# Patient Record
Sex: MALE | Race: WHITE | NOT HISPANIC OR LATINO | URBAN - METROPOLITAN AREA
[De-identification: names, ages, dates, MRNs, and addresses within clinical notes are randomized per-mention and may not be internally consistent; named-entity substitution may affect disease eponyms.]

---

## 2024-06-28 ENCOUNTER — TELEPHONE (OUTPATIENT)
Dept: GASTROENTEROLOGY | Facility: CLINIC | Age: 29
End: 2024-06-28

## 2024-06-28 NOTE — TELEPHONE ENCOUNTER
Pt mother states that her son states that when he wiped he saw blood. She wants to know if he should be worried? States this only happened one time.

## 2024-08-09 ENCOUNTER — APPOINTMENT (OUTPATIENT)
Dept: PRIMARY CARE | Facility: CLINIC | Age: 29
End: 2024-08-09
Payer: COMMERCIAL

## 2024-08-09 VITALS
TEMPERATURE: 98.1 F | HEIGHT: 67 IN | SYSTOLIC BLOOD PRESSURE: 121 MMHG | WEIGHT: 138 LBS | OXYGEN SATURATION: 98 % | BODY MASS INDEX: 21.66 KG/M2 | DIASTOLIC BLOOD PRESSURE: 78 MMHG | HEART RATE: 47 BPM

## 2024-08-09 DIAGNOSIS — Z00.00 HEALTHCARE MAINTENANCE: Primary | ICD-10-CM

## 2024-08-09 PROCEDURE — 84443 ASSAY THYROID STIM HORMONE: CPT | Performed by: INTERNAL MEDICINE

## 2024-08-09 PROCEDURE — 1036F TOBACCO NON-USER: CPT | Performed by: INTERNAL MEDICINE

## 2024-08-09 PROCEDURE — 99395 PREV VISIT EST AGE 18-39: CPT | Performed by: INTERNAL MEDICINE

## 2024-08-09 PROCEDURE — 3008F BODY MASS INDEX DOCD: CPT | Performed by: INTERNAL MEDICINE

## 2024-08-09 PROCEDURE — 85025 COMPLETE CBC W/AUTO DIFF WBC: CPT | Performed by: INTERNAL MEDICINE

## 2024-08-09 PROCEDURE — 80061 LIPID PANEL: CPT | Performed by: INTERNAL MEDICINE

## 2024-08-09 PROCEDURE — 80053 COMPREHEN METABOLIC PANEL: CPT | Performed by: INTERNAL MEDICINE

## 2024-08-09 ASSESSMENT — PATIENT HEALTH QUESTIONNAIRE - PHQ9
SUM OF ALL RESPONSES TO PHQ9 QUESTIONS 1 AND 2: 0
1. LITTLE INTEREST OR PLEASURE IN DOING THINGS: NOT AT ALL
2. FEELING DOWN, DEPRESSED OR HOPELESS: NOT AT ALL

## 2024-08-09 ASSESSMENT — ENCOUNTER SYMPTOMS
LOSS OF SENSATION IN FEET: 0
OCCASIONAL FEELINGS OF UNSTEADINESS: 0
DEPRESSION: 0

## 2024-08-09 ASSESSMENT — PAIN SCALES - GENERAL: PAINLEVEL: 0-NO PAIN

## 2024-08-09 NOTE — PROGRESS NOTES
"Subjective   Patient ID: David Hanna is a 29 y.o. male who presents for Annual Exam (Pt c/o interrmitten stomach pains).    HPI   29 years old male comes in to see me for an age related wellness exam.  He has no previous medical or surgical history except for history of gastritis and heartburn most likely reflux disease.  He knows the cause of it and he knows it is old related to food he ate.  He loves spicy food.  No prescription medication  No past medical or surgical history.  He had appendectomy years ago.  No known allergies.  Non-smoker no vaping.  No alcohol occasionally rarely.  Single no children.  Works at [employer].  GI manage in the past with Dr. Justice.    Review of Systems  12 system review 12 system negative except for heartburn after certain food he eats.  Objective   /78 (BP Location: Left arm, Patient Position: Sitting, BP Cuff Size: Adult)   Pulse (!) 47   Temp 36.7 °C (98.1 °F) (Temporal)   Ht 1.695 m (5' 6.73\")   Wt 62.6 kg (138 lb)   SpO2 98%   BMI 21.79 kg/m²     Physical Exam  Alert oriented no distress.  Nonicteric sclera no jaundice.  Face symmetrical cranial nerves intact.  Neck no masses no lymphadenopathy no thyromegaly.  Lungs clear no rales wheezing or crackles.  Heart normal S1 and S2 regular rhythm.  Abdomen benign nontender no masses no organomegaly no pain on palpation.  Neurological exam intact.  Assessment/Plan   Diagnoses and all orders for this visit:  Healthcare maintenance  -     CBC  -     Lipid Panel  -     Thyroid Stimulating Hormone  -     Comprehensive Metabolic Panel         "

## 2024-08-13 ENCOUNTER — TELEPHONE (OUTPATIENT)
Dept: PRIMARY CARE | Facility: CLINIC | Age: 29
End: 2024-08-13
Payer: COMMERCIAL

## 2024-08-13 NOTE — TELEPHONE ENCOUNTER
----- Message from Alex Jha sent at 8/13/2024  8:40 AM EDT -----  Regarding: r  Lab results from his last visit are all within normal limit.  If any questions call the office.  White count is slightly down 3.97 need to be watched with a few sugar otherwise all is well

## 2024-08-13 NOTE — TELEPHONE ENCOUNTER
Pt's mother called requesting a phone call to go over pt's most recent blood work. Mother stated she was concerned stating she saw that three were flagged on his mychart.

## 2024-10-29 ENCOUNTER — APPOINTMENT (OUTPATIENT)
Dept: PRIMARY CARE | Facility: CLINIC | Age: 29
End: 2024-10-29
Payer: COMMERCIAL

## 2024-10-29 VITALS
DIASTOLIC BLOOD PRESSURE: 88 MMHG | SYSTOLIC BLOOD PRESSURE: 145 MMHG | OXYGEN SATURATION: 98 % | WEIGHT: 138 LBS | HEART RATE: 51 BPM | BODY MASS INDEX: 21.79 KG/M2 | TEMPERATURE: 97.5 F

## 2024-10-29 DIAGNOSIS — K21.00 GASTROESOPHAGEAL REFLUX DISEASE WITH ESOPHAGITIS WITHOUT HEMORRHAGE: ICD-10-CM

## 2024-10-29 DIAGNOSIS — D72.819 LEUKOPENIA, UNSPECIFIED TYPE: Primary | ICD-10-CM

## 2024-10-29 LAB
BASOPHILS # BLD AUTO: 0.01 X10*3/UL (ref 0.1–1.6)
BASOPHILS NFR BLD AUTO: 0.31 % (ref 0–0.3)
EOSINOPHIL # BLD AUTO: 0.04 X10*3/UL (ref 0.04–0.5)
EOSINOPHIL NFR BLD AUTO: 1.06 % (ref 0.7–7)
ERYTHROCYTE [DISTWIDTH] IN BLOOD BY AUTOMATED COUNT: 13.5 % (ref 11.5–14.5)
HCT VFR BLD AUTO: 44.5 % (ref 38.4–51.3)
HGB BLD-MCNC: 15.68 G/DL (ref 12.7–17)
LYMPHOCYTES # BLD AUTO: 1.37 X10*3/UL (ref 0–6)
LYMPHOCYTES NFR BLD AUTO: 33.87 % (ref 20.5–51.1)
MCH RBC QN AUTO: 30.9 PG (ref 26–32)
MCHC RBC AUTO-ENTMCNC: 35.2 G/DL (ref 31–38)
MCV RBC AUTO: 87.5 FL (ref 80–96)
MONOCYTES # BLD AUTO: 0.31 X10*3/UL (ref 1.6–24.9)
MONOCYTES NFR BLD AUTO: 7.6 % (ref 1.7–9.3)
NEUTROPHILS # BLD AUTO: 2.31 X10*3/UL (ref 1.4–6.5)
NEUTROPHILS NFR BLD AUTO: 57.16 % (ref 42.2–75.2)
PLATELET # BLD AUTO: 268.8 X10*3/UL (ref 150–450)
PMV BLD AUTO: 8.71 FL (ref 7.8–11)
RBC # BLD AUTO: 5.08 X10*6/UL (ref 4.1–5.6)
WBC # BLD AUTO: 4.04 X10*3/UL (ref 4.5–10.5)

## 2024-10-29 PROCEDURE — 99213 OFFICE O/P EST LOW 20 MIN: CPT | Performed by: INTERNAL MEDICINE

## 2024-10-29 PROCEDURE — 85025 COMPLETE CBC W/AUTO DIFF WBC: CPT | Performed by: INTERNAL MEDICINE

## 2024-10-29 PROCEDURE — 1036F TOBACCO NON-USER: CPT | Performed by: INTERNAL MEDICINE

## 2024-10-29 ASSESSMENT — PATIENT HEALTH QUESTIONNAIRE - PHQ9
SUM OF ALL RESPONSES TO PHQ9 QUESTIONS 1 AND 2: 0
2. FEELING DOWN, DEPRESSED OR HOPELESS: NOT AT ALL
1. LITTLE INTEREST OR PLEASURE IN DOING THINGS: NOT AT ALL

## 2024-10-29 ASSESSMENT — ENCOUNTER SYMPTOMS
DEPRESSION: 0
LOSS OF SENSATION IN FEET: 0
OCCASIONAL FEELINGS OF UNSTEADINESS: 0

## 2024-10-30 ENCOUNTER — TELEPHONE (OUTPATIENT)
Dept: POST ACUTE CARE | Facility: EXTERNAL LOCATION | Age: 29
End: 2024-10-30
Payer: COMMERCIAL

## 2024-10-30 ENCOUNTER — TELEPHONE (OUTPATIENT)
Dept: PRIMARY CARE | Facility: CLINIC | Age: 29
End: 2024-10-30
Payer: COMMERCIAL

## 2024-11-14 ENCOUNTER — APPOINTMENT (OUTPATIENT)
Dept: PRIMARY CARE | Facility: CLINIC | Age: 29
End: 2024-11-14
Payer: COMMERCIAL

## 2025-03-06 ENCOUNTER — APPOINTMENT (OUTPATIENT)
Dept: PRIMARY CARE | Facility: CLINIC | Age: 30
End: 2025-03-06
Payer: COMMERCIAL

## 2025-03-06 VITALS
WEIGHT: 133.5 LBS | BODY MASS INDEX: 21.08 KG/M2 | TEMPERATURE: 97.8 F | SYSTOLIC BLOOD PRESSURE: 115 MMHG | OXYGEN SATURATION: 98 % | HEART RATE: 49 BPM | DIASTOLIC BLOOD PRESSURE: 70 MMHG

## 2025-03-06 DIAGNOSIS — E78.2 MIXED HYPERLIPIDEMIA: ICD-10-CM

## 2025-03-06 DIAGNOSIS — R63.4 WEIGHT LOSS, ABNORMAL: ICD-10-CM

## 2025-03-06 DIAGNOSIS — D72.819 LEUKOPENIA, UNSPECIFIED TYPE: Primary | ICD-10-CM

## 2025-03-06 DIAGNOSIS — Z13.1 SCREENING FOR DIABETES MELLITUS: ICD-10-CM

## 2025-03-06 LAB
ALBUMIN SERPL BCP-MCNC: 4.3 G/DL (ref 3.4–5)
ALP SERPL-CCNC: 67 U/L (ref 45–117)
ALT SERPL W P-5'-P-CCNC: 19 U/L (ref 14–59)
ANION GAP SERPL CALC-SCNC: 13 MMOL/L (ref 10–20)
AST SERPL W P-5'-P-CCNC: 11 U/L (ref 15–37)
BASOPHILS # BLD AUTO: 0.01 X10*3/UL (ref 0.1–1.6)
BASOPHILS NFR BLD AUTO: 0.2 % (ref 0–0.3)
BILIRUB SERPL-MCNC: 1 MG/DL (ref 0.2–1)
BUN SERPL-MCNC: 14 MG/DL (ref 7–18)
CALCIUM SERPL-MCNC: 9.1 MG/DL (ref 8.5–10.1)
CHLORIDE SERPL-SCNC: 106 MMOL/L (ref 98–107)
CHOLEST SERPL-MCNC: 142 MG/DL (ref 0–199)
CHOLESTEROL/HDL RATIO: 2.3 (ref 4.2–7)
CO2 SERPL-SCNC: 29 MMOL/L (ref 21–32)
CREAT SERPL-MCNC: 0.71 MG/DL (ref 0.6–1.1)
EGFRCR SERPLBLD CKD-EPI 2021: >90 ML/MIN/1.73M*2
EOSINOPHIL # BLD AUTO: 0.05 X10*3/UL (ref 0.04–0.5)
EOSINOPHIL NFR BLD AUTO: 1.41 % (ref 0.7–7)
ERYTHROCYTE [DISTWIDTH] IN BLOOD BY AUTOMATED COUNT: 13.6 % (ref 11.5–14.5)
GLUCOSE SERPL-MCNC: 92 MG/DL (ref 74–100)
HBA1C MFR BLD: 4.9 %
HCT VFR BLD AUTO: 41.2 % (ref 38.4–51.3)
HDLC SERPL-MCNC: 62 MG/DL (ref 40–59)
HGB BLD-MCNC: 14.64 G/DL (ref 12.7–17)
IS PATIENT FASTING: ABNORMAL
LDLC SERPL DIRECT ASSAY-MCNC: 70 MG/DL (ref 0–100)
LYMPHOCYTES # BLD AUTO: 1.2 X10*3/UL (ref 0–6)
LYMPHOCYTES NFR BLD AUTO: 34.37 % (ref 20.5–51.1)
MCH RBC QN AUTO: 31 PG (ref 26–32)
MCHC RBC AUTO-ENTMCNC: 35.5 G/DL (ref 31–38)
MCV RBC AUTO: 87.1 FL (ref 80–96)
MONOCYTES # BLD AUTO: 0.3 X10*3/UL (ref 1.6–24.9)
MONOCYTES NFR BLD AUTO: 8.72 % (ref 1.7–9.3)
NEUTROPHILS # BLD AUTO: 1.93 X10*3/UL (ref 1.4–6.5)
NEUTROPHILS NFR BLD AUTO: 55.3 % (ref 42.2–75.2)
PLATELET # BLD AUTO: 303.9 X10*3/UL (ref 150–450)
PMV BLD AUTO: 9.11 FL (ref 7.8–11)
POTASSIUM SERPL-SCNC: 4.1 MMOL/L (ref 3.5–5.1)
PROT SERPL-MCNC: 7.1 G/DL (ref 6.4–8.2)
RBC # BLD AUTO: 4.73 X10*6/UL (ref 4.1–5.6)
SODIUM SERPL-SCNC: 144 MMOL/L (ref 136–145)
TRIGL SERPL-MCNC: 15 MG/DL
TSH SERPL-ACNC: 0.45 MIU/L (ref 0.44–3.98)
WBC # BLD AUTO: 3.49 X10*3/UL (ref 4.5–10.5)

## 2025-03-06 PROCEDURE — 85025 COMPLETE CBC W/AUTO DIFF WBC: CPT | Performed by: INTERNAL MEDICINE

## 2025-03-06 PROCEDURE — 1036F TOBACCO NON-USER: CPT | Performed by: INTERNAL MEDICINE

## 2025-03-06 PROCEDURE — 80053 COMPREHEN METABOLIC PANEL: CPT | Performed by: INTERNAL MEDICINE

## 2025-03-06 PROCEDURE — 84443 ASSAY THYROID STIM HORMONE: CPT | Performed by: INTERNAL MEDICINE

## 2025-03-06 PROCEDURE — 99214 OFFICE O/P EST MOD 30 MIN: CPT | Performed by: INTERNAL MEDICINE

## 2025-03-06 PROCEDURE — 80061 LIPID PANEL: CPT | Performed by: INTERNAL MEDICINE

## 2025-03-06 PROCEDURE — 83036 HEMOGLOBIN GLYCOSYLATED A1C: CPT | Performed by: INTERNAL MEDICINE

## 2025-03-06 ASSESSMENT — PATIENT HEALTH QUESTIONNAIRE - PHQ9
1. LITTLE INTEREST OR PLEASURE IN DOING THINGS: NOT AT ALL
2. FEELING DOWN, DEPRESSED OR HOPELESS: NOT AT ALL
SUM OF ALL RESPONSES TO PHQ9 QUESTIONS 1 AND 2: 0

## 2025-03-06 ASSESSMENT — ENCOUNTER SYMPTOMS
DEPRESSION: 0
LOSS OF SENSATION IN FEET: 0
OCCASIONAL FEELINGS OF UNSTEADINESS: 0

## 2025-03-06 NOTE — PROGRESS NOTES
Subjective   Patient ID: David Hanna is a 30 y.o. male who presents for Follow-up (5 mos- checking blood counts/).    HPI   30 years old male comes in to see me today accompanied by his father to check on his WBC or white count since his white count has been on the low side?  Leukopenia.  During his visit we found out that he lost also almost 5 pounds unintentionally.  He admits with his father and mother who joined us late or later that he is eating a lot of food all the time has good appetite so is not lack of food or poor appetite which is causing weight loss?  They admit all of them including himself that he eats a lot of sweets and chocolate during the day.  He does not weigh himself at home and now he is going to start doing so and keep a log on it.  Unintentional weight loss could be multiple etiology causing it more important and interesting rule out malignancy.  So from now on he is going to increase his supplement intake like Ensure or boost, he going to take it twice a day, maybe  3 times a day, he is going to weigh himself on the regular basis and keep a log.  We want to need to check on his lab including his CBC CMP A1c rule out diabetes mellitus , lipid panel and a TSH.  He admits when asked about polydipsia and polyuria.  He feels thirst on and off  Review of Systems  12 system review 12 system negative no chest pain shortness of breath cough fever chills abdominal pain nausea vomiting diarrhea or constipation no GI symptoms denies bleeding hematuria or melena or any rectal bleed.  No history in the family of diabetes mellitus.  Objective   /70 (BP Location: Left arm, Patient Position: Sitting, BP Cuff Size: Adult)   Pulse (!) 49   Temp 36.6 °C (97.8 °F) (Temporal)   Wt 60.6 kg (133 lb 8 oz)   SpO2 98%   BMI 21.08 kg/m²     Physical Exam  Alert oriented in no distress pale looking man.  Nonicteric sclera no jaundice.  Face symmetrical cranial nerves intact.  Neck supple no masses lymph no  thyromegaly jugular venous distention or carotid bruits.  Lungs clear no rales wheezing or crackles.  Heart normal S1 and S2 regular rhythm.  Abdomen benign nontender no masses no organomegaly no pain on palpation no murmur.  Neurological exam equal strength in the upper and lower extremities.  No sensory deficit.  No skin rash no skin disease.  Please follow-up in 4 weeks with a log of your weight or any new symptoms.  We may need GI consultation.  And consultation with hematology  Assessment/Plan   Diagnoses and all orders for this visit:  Leukopenia, unspecified type  -     CBC w/5 Part Differential, [facility]  Screening for diabetes mellitus  -     Hemoglobin A1c  Weight loss, abnormal  -     Comprehensive Metabolic Panel  -     Thyroid Stimulating Hormone  Mixed hyperlipidemia  -     Lipid Panel

## 2025-04-08 ENCOUNTER — APPOINTMENT (OUTPATIENT)
Dept: PRIMARY CARE | Facility: CLINIC | Age: 30
End: 2025-04-08
Payer: COMMERCIAL

## 2025-04-08 ENCOUNTER — OFFICE VISIT (OUTPATIENT)
Dept: PRIMARY CARE | Facility: CLINIC | Age: 30
End: 2025-04-08
Payer: COMMERCIAL

## 2025-04-08 VITALS
SYSTOLIC BLOOD PRESSURE: 138 MMHG | BODY MASS INDEX: 21.63 KG/M2 | TEMPERATURE: 97.2 F | WEIGHT: 137 LBS | HEART RATE: 49 BPM | OXYGEN SATURATION: 97 % | DIASTOLIC BLOOD PRESSURE: 89 MMHG

## 2025-04-08 DIAGNOSIS — R79.89 LOW VITAMIN D LEVEL: ICD-10-CM

## 2025-04-08 DIAGNOSIS — D70.9 NEUTROPENIA, UNSPECIFIED TYPE: Primary | ICD-10-CM

## 2025-04-08 DIAGNOSIS — D50.9 IRON DEFICIENCY ANEMIA, UNSPECIFIED IRON DEFICIENCY ANEMIA TYPE: ICD-10-CM

## 2025-04-08 LAB
25(OH)D3 SERPL-MCNC: 15 NG/ML (ref 30–100)
BASOPHILS # BLD AUTO: 0.01 X10*3/UL (ref 0.1–1.6)
BASOPHILS NFR BLD AUTO: 0.21 % (ref 0–0.3)
EOSINOPHIL # BLD AUTO: 0.08 X10*3/UL (ref 0.04–0.5)
EOSINOPHIL NFR BLD AUTO: 1.92 % (ref 0.7–7)
ERYTHROCYTE [DISTWIDTH] IN BLOOD BY AUTOMATED COUNT: 13.5 % (ref 11.5–14.5)
HCT VFR BLD AUTO: 43.4 % (ref 38.4–51.3)
HGB BLD-MCNC: 15.67 G/DL (ref 12.7–17)
LYMPHOCYTES # BLD AUTO: 1.53 X10*3/UL (ref 0–6)
LYMPHOCYTES NFR BLD AUTO: 35.02 % (ref 20.5–51.1)
MCH RBC QN AUTO: 31.4 PG (ref 26–32)
MCHC RBC AUTO-ENTMCNC: 36.1 G/DL (ref 31–38)
MCV RBC AUTO: 87 FL (ref 80–96)
MONOCYTES # BLD AUTO: 0.36 X10*3/UL (ref 1.6–24.9)
MONOCYTES NFR BLD AUTO: 8.37 % (ref 1.7–9.3)
NEUTROPHILS # BLD AUTO: 2.38 X10*3/UL (ref 1.4–6.5)
NEUTROPHILS NFR BLD AUTO: 54.48 % (ref 42.2–75.2)
PLATELET # BLD AUTO: 259.3 X10*3/UL (ref 150–450)
PMV BLD AUTO: 8.99 FL (ref 7.8–11)
RBC # BLD AUTO: 4.99 X10*6/UL (ref 4.1–5.6)
WBC # BLD AUTO: 4.36 X10*3/UL (ref 4.5–10.5)

## 2025-04-08 PROCEDURE — 1036F TOBACCO NON-USER: CPT | Performed by: INTERNAL MEDICINE

## 2025-04-08 PROCEDURE — 99214 OFFICE O/P EST MOD 30 MIN: CPT | Performed by: INTERNAL MEDICINE

## 2025-04-08 PROCEDURE — 82306 VITAMIN D 25 HYDROXY: CPT | Performed by: INTERNAL MEDICINE

## 2025-04-08 ASSESSMENT — ENCOUNTER SYMPTOMS
OCCASIONAL FEELINGS OF UNSTEADINESS: 0
LOSS OF SENSATION IN FEET: 0
DEPRESSION: 0

## 2025-04-08 NOTE — PROGRESS NOTES
Subjective   Patient ID: David Hanna is a 30 y.o. male who presents for Follow-up.    HPI   30 years old male comes in to see me accompanied by his father and his mother.  He is doing very well has no specific complaints or symptoms other than the epigastric or abdominal indigestion pending what he eats every day.  He is taking Ensure 3 times a day and his weight is up to 135 pounds.  He has no specific complaints otherwise.  We agreed that because of the leukopenia better consult hematologist  Review of Systems  12 system review 12 system are negative.  Mother is concerned about vitamin D and iron supplement.  Objective   /89 (BP Location: Left arm, Patient Position: Sitting, BP Cuff Size: Adult)   Pulse (!) 49   Temp 36.2 °C (97.2 °F) (Temporal)   Wt 62.1 kg (137 lb)   SpO2 97%   BMI 21.63 kg/m²     Physical Exam  Alert oriented in no distress nonicteric sclera no jaundice.  Face symmetrical cranial nerves intact.  Neck supple no masses lymph no thyromegaly or jugular venous distention no carotid bruits.  Lungs clear no rales wheezing or crackles.  Heart normal S1 and S2 regular rhythm.  Abdomen benign nontender no masses no organomegaly no pain on palpitations.  No epigastric bruits.  Neurologically intact equal strength in the upper and in the lower extremities.  No sensory deficit.  Normal cranial nerves.  Normal moods and behavior.  Skin intact  Will get vitamin D level iron levels and complete blood count.  Referral to see hematology Dr. Faizan Moses  Assessment/Plan   Diagnoses and all orders for this visit:  Neutropenia, unspecified type  -     Referral To Hematology and Oncology; Future  Low vitamin D level  -     Vitamin D 25-Hydroxy,Total (for eval of Vitamin D levels)  Iron deficiency anemia, unspecified iron deficiency anemia type  -     Iron and TIBC

## 2025-04-09 ENCOUNTER — TELEPHONE (OUTPATIENT)
Dept: PRIMARY CARE | Facility: CLINIC | Age: 30
End: 2025-04-09
Payer: COMMERCIAL

## 2025-04-09 LAB
IRON SATN MFR SERPL: 24 % (CALC) (ref 20–48)
IRON SERPL-MCNC: 74 MCG/DL (ref 50–195)
TIBC SERPL-MCNC: 303 MCG/DL (CALC) (ref 250–425)

## 2025-04-09 NOTE — TELEPHONE ENCOUNTER
I called the patient and left him a message to increase and take vitamin D 2000 mg a day.  Also informed him about his iron study came back normal, normal iron studies and his white count is up which is a good thing so keep doing what you are doing take to vitamin D from now on 2000 mg a day and keep taking the supplement up to 3 times a day and see the hematologist for safety reason and will say thank you have a nice day

## 2025-06-04 NOTE — PROGRESS NOTES
"HEMATOLOGY/ONCOLOGY CLINIC NOTE    Consult requested by Dr. Jha for evaluation and treatment of leukopenia.    HPI:  31 y/o man with isolated mild monocytopenia stable over the course of 7 months presents for evaluation.   Has lost 4 lbs over the past 2-3 months.  Gets full a little faster than he used to.  Had a sore throat mid May.  He's had a an unremarkable dyspepsia workup including a negative EGD.  He feels food gets \"stuck in his throat.\"    Denies f/c or night sweats.     No recent infection.  No abnormal joint pain.    Family of [place] decent.  Sister is 21 also has feeling of ab bloating but no fevers or susan ab pain.    Oncology History and Treatment synopsis  Oncology History    No history exists.       PMH:  Medical History[1]    PSH:  Surgical History[2]    SH:  Social History     Tobacco Use    Smoking status: Never    Smokeless tobacco: Never   Substance Use Topics    Alcohol use: Not Currently      reports no history of drug use.      CURRENT MEDS:  Medications Ordered Prior to Encounter[3]    PHYSICAL EXAM:  /78   Pulse 63   Temp 36.4 °C (97.5 °F)   Resp 16   Ht (S) 1.723 m (5' 7.84\")   Wt 60.8 kg (134 lb 0.6 oz)   SpO2 100%   BMI 20.48 kg/m²     KPS: 100  Physical Exam  Constitutional:       Appearance: Normal appearance.   HENT:      Head: Normocephalic and atraumatic.      Nose: Nose normal.      Mouth/Throat:      Mouth: Mucous membranes are moist.   Cardiovascular:      Rate and Rhythm: Normal rate and regular rhythm.      Pulses: Normal pulses.      Heart sounds: Normal heart sounds.   Pulmonary:      Effort: Pulmonary effort is normal.      Breath sounds: Normal breath sounds.   Abdominal:      General: Abdomen is flat.      Palpations: Abdomen is soft.      Comments: +splenomegaly     Musculoskeletal:         General: Normal range of motion.      Cervical back: Normal range of motion and neck supple.   Lymphadenopathy:      Cervical: Cervical adenopathy present.   Skin:     " "General: Skin is warm and dry.      Capillary Refill: Capillary refill takes 2 to 3 seconds.   Neurological:      General: No focal deficit present.      Mental Status: He is alert. Mental status is at baseline. He is disoriented.   Psychiatric:         Mood and Affect: Mood normal.         Behavior: Behavior normal.         Thought Content: Thought content normal.         Judgment: Judgment normal.           LAB DATA:    No components found for: \"CBC\"  Lab Results   Component Value Date    WBC 4.36 (L) 04/08/2025    WBC 3.49 (L) 03/06/2025    WBC 4.04 (L) 10/29/2024    WBC 5.2 02/01/2023     Lab Results   Component Value Date    HGB 15.67 04/08/2025    HGB 14.64 03/06/2025    HGB 15.68 10/29/2024    HGB 15.4 02/01/2023     Lab Results   Component Value Date    .3 04/08/2025    .9 03/06/2025    .8 10/29/2024     02/01/2023       Lab Results   Component Value Date    GLUCOSE 92 03/06/2025    CALCIUM 9.1 03/06/2025     03/06/2025    K 4.1 03/06/2025    CO2 29 03/06/2025     03/06/2025    BUN 14 03/06/2025    CREATININE 0.71 03/06/2025     Lab Results   Component Value Date    CREATININE 0.71 03/06/2025    CREATININE 0.77 02/01/2023       Lab Results   Component Value Date    ALT 19 03/06/2025    AST 11 (L) 03/06/2025    ALKPHOS 67 03/06/2025    BILITOT 1.0 03/06/2025           ASSESSMENT AND PLAN    29 y/o man with mild unintended weight loss and early satiety in the setting of recent illness found to have mild cervical adenopathy and splenomegaly.  Most commonly this would be attributed to mono, but given his reported dysphagia, I will rule out a mediastinal mass.    Regarding counts:  modest leukopenia/monocytopenia- may be due to inflammation/infection.   [place] count by [facility] lab is WNL.  With normal platelets and hgb defer BMBx.    Plan:    -CBC w diff -6-6 testing all with individual WBC type diffs WNL.  I will review smear.  -comp  -ldh  -ESR CRP  -ebv testing  -hiv " testing  -CT neck chest ab pelvis    FUV in 3 weeks to discuss results.      Duration of Visit  This is a New visit. I spent 55 minutes in the care of this patient, including preparing, chart and results review, face time and charting.         Slade Hines MD PhD                   [1] No past medical history on file.  [2] No past surgical history on file.  [3]   No current outpatient medications on file prior to visit.     No current facility-administered medications on file prior to visit.

## 2025-06-06 ENCOUNTER — LAB (OUTPATIENT)
Dept: LAB | Facility: HOSPITAL | Age: 30
End: 2025-06-06
Payer: COMMERCIAL

## 2025-06-06 ENCOUNTER — OFFICE VISIT (OUTPATIENT)
Dept: HEMATOLOGY/ONCOLOGY | Facility: HOSPITAL | Age: 30
End: 2025-06-06
Payer: COMMERCIAL

## 2025-06-06 VITALS
OXYGEN SATURATION: 100 % | BODY MASS INDEX: 20.31 KG/M2 | HEART RATE: 63 BPM | DIASTOLIC BLOOD PRESSURE: 78 MMHG | HEIGHT: 68 IN | TEMPERATURE: 97.5 F | SYSTOLIC BLOOD PRESSURE: 133 MMHG | WEIGHT: 134.04 LBS | RESPIRATION RATE: 16 BRPM

## 2025-06-06 DIAGNOSIS — R63.4 UNINTENDED WEIGHT LOSS: ICD-10-CM

## 2025-06-06 DIAGNOSIS — R16.1 SPLENOMEGALY: ICD-10-CM

## 2025-06-06 DIAGNOSIS — R59.1 LYMPHADENOPATHY: Primary | ICD-10-CM

## 2025-06-06 DIAGNOSIS — R59.1 LYMPHADENOPATHY: ICD-10-CM

## 2025-06-06 DIAGNOSIS — D70.9 NEUTROPENIA, UNSPECIFIED TYPE: ICD-10-CM

## 2025-06-06 LAB
ALBUMIN SERPL BCP-MCNC: 4.6 G/DL (ref 3.4–5)
ALP SERPL-CCNC: 81 U/L (ref 33–120)
ALT SERPL W P-5'-P-CCNC: 17 U/L (ref 10–52)
ANION GAP SERPL CALC-SCNC: 12 MMOL/L (ref 10–20)
AST SERPL W P-5'-P-CCNC: 16 U/L (ref 9–39)
BASOPHILS # BLD AUTO: 0.03 X10*3/UL (ref 0–0.1)
BASOPHILS NFR BLD AUTO: 0.9 %
BILIRUB SERPL-MCNC: 0.6 MG/DL (ref 0–1.2)
BUN SERPL-MCNC: 13 MG/DL (ref 6–23)
CALCIUM SERPL-MCNC: 9.7 MG/DL (ref 8.6–10.3)
CHLORIDE SERPL-SCNC: 102 MMOL/L (ref 98–107)
CO2 SERPL-SCNC: 30 MMOL/L (ref 21–32)
CREAT SERPL-MCNC: 0.7 MG/DL (ref 0.5–1.3)
CRP SERPL-MCNC: 0.43 MG/DL
EBV EA IGG SER QL: POSITIVE
EBV NA AB SER QL: POSITIVE
EBV VCA IGG SER IA-ACNC: POSITIVE
EBV VCA IGM SER IA-ACNC: NEGATIVE
EGFRCR SERPLBLD CKD-EPI 2021: >90 ML/MIN/1.73M*2
EOSINOPHIL # BLD AUTO: 0.11 X10*3/UL (ref 0–0.7)
EOSINOPHIL NFR BLD AUTO: 3.2 %
ERYTHROCYTE [DISTWIDTH] IN BLOOD BY AUTOMATED COUNT: 12.7 % (ref 11.5–14.5)
ERYTHROCYTE [SEDIMENTATION RATE] IN BLOOD BY WESTERGREN METHOD: 5 MM/H (ref 0–15)
GLUCOSE SERPL-MCNC: 90 MG/DL (ref 74–99)
HCT VFR BLD AUTO: 43.9 % (ref 41–52)
HETEROPH AB SERPLBLD QL IA.RAPID: NEGATIVE
HGB BLD-MCNC: 14.5 G/DL (ref 13.5–17.5)
HIV 1+2 AB+HIV1 P24 AG SERPL QL IA: NONREACTIVE
IMM GRANULOCYTES # BLD AUTO: 0.01 X10*3/UL (ref 0–0.7)
IMM GRANULOCYTES NFR BLD AUTO: 0.3 % (ref 0–0.9)
LDH SERPL L TO P-CCNC: 132 U/L (ref 84–246)
LYMPHOCYTES # BLD AUTO: 1.21 X10*3/UL (ref 1.2–4.8)
LYMPHOCYTES NFR BLD AUTO: 35 %
MCH RBC QN AUTO: 29.4 PG (ref 26–34)
MCHC RBC AUTO-ENTMCNC: 33 G/DL (ref 32–36)
MCV RBC AUTO: 89 FL (ref 80–100)
MONOCYTES # BLD AUTO: 0.34 X10*3/UL (ref 0.1–1)
MONOCYTES NFR BLD AUTO: 9.8 %
NEUTROPHILS # BLD AUTO: 1.76 X10*3/UL (ref 1.2–7.7)
NEUTROPHILS NFR BLD AUTO: 50.8 %
NRBC BLD-RTO: 0 /100 WBCS (ref 0–0)
PLATELET # BLD AUTO: 264 X10*3/UL (ref 150–450)
POTASSIUM SERPL-SCNC: 4.2 MMOL/L (ref 3.5–5.3)
PROT SERPL-MCNC: 7.7 G/DL (ref 6.4–8.2)
RBC # BLD AUTO: 4.93 X10*6/UL (ref 4.5–5.9)
SODIUM SERPL-SCNC: 140 MMOL/L (ref 136–145)
WBC # BLD AUTO: 3.5 X10*3/UL (ref 4.4–11.3)

## 2025-06-06 PROCEDURE — 85025 COMPLETE CBC W/AUTO DIFF WBC: CPT

## 2025-06-06 PROCEDURE — 99215 OFFICE O/P EST HI 40 MIN: CPT | Mod: 25 | Performed by: STUDENT IN AN ORGANIZED HEALTH CARE EDUCATION/TRAINING PROGRAM

## 2025-06-06 PROCEDURE — 85652 RBC SED RATE AUTOMATED: CPT

## 2025-06-06 PROCEDURE — 86140 C-REACTIVE PROTEIN: CPT

## 2025-06-06 PROCEDURE — 36415 COLL VENOUS BLD VENIPUNCTURE: CPT

## 2025-06-06 PROCEDURE — 87389 HIV-1 AG W/HIV-1&-2 AB AG IA: CPT

## 2025-06-06 PROCEDURE — 83615 LACTATE (LD) (LDH) ENZYME: CPT

## 2025-06-06 PROCEDURE — 86308 HETEROPHILE ANTIBODY SCREEN: CPT

## 2025-06-06 PROCEDURE — 86663 EPSTEIN-BARR ANTIBODY: CPT

## 2025-06-06 PROCEDURE — 3008F BODY MASS INDEX DOCD: CPT | Performed by: STUDENT IN AN ORGANIZED HEALTH CARE EDUCATION/TRAINING PROGRAM

## 2025-06-06 PROCEDURE — 1036F TOBACCO NON-USER: CPT | Performed by: STUDENT IN AN ORGANIZED HEALTH CARE EDUCATION/TRAINING PROGRAM

## 2025-06-06 PROCEDURE — 99205 OFFICE O/P NEW HI 60 MIN: CPT | Performed by: STUDENT IN AN ORGANIZED HEALTH CARE EDUCATION/TRAINING PROGRAM

## 2025-06-06 PROCEDURE — 84075 ASSAY ALKALINE PHOSPHATASE: CPT

## 2025-06-06 PROCEDURE — 86664 EPSTEIN-BARR NUCLEAR ANTIGEN: CPT

## 2025-06-06 ASSESSMENT — PAIN SCALES - GENERAL: PAINLEVEL_OUTOF10: 0-NO PAIN

## 2025-06-06 NOTE — LETTER
"June 6, 2025     Alex Jha MD  [address] [facility] [id number]    Patient: David Hanna   YOB: 1995   Date of Visit: 6/6/2025       Dear Dr. Alex Jha MD:    Thank you for referring David Hanna to me for evaluation. Below are my notes for this consultation.  If you have questions, please do not hesitate to call me. I look forward to following your patient along with you.       Sincerely,     Slade Hines MD PhD      CC: No Recipients  ______________________________________________________________________________________    HEMATOLOGY/ONCOLOGY CLINIC NOTE    Consult requested by Dr. Jha for evaluation and treatment of leukopenia.    HPI:  29 y/o man with isolated mild monocytopenia stable over the course of 7 months presents for evaluation.   Has lost 4 lbs over the past 2-3 months.  Gets full a little faster than he used to.  Had a sore throat mid May.  He's had a an unremarkable dyspepsia workup including a negative EGD.  He feels food gets \"stuck in his throat.\"    Denies f/c or night sweats.     No recent infection.  No abnormal joint pain.    Family of [place] decent.  Sister is 21 also has feeling of ab bloating but no fevers or susan ab pain.    Oncology History and Treatment synopsis  Oncology History    No history exists.       PMH:  Medical History[1]    PSH:  Surgical History[2]    SH:  Social History     Tobacco Use   • Smoking status: Never   • Smokeless tobacco: Never   Substance Use Topics   • Alcohol use: Not Currently      reports no history of drug use.      CURRENT MEDS:  Medications Ordered Prior to Encounter[3]    PHYSICAL EXAM:  /78   Pulse 63   Temp 36.4 °C (97.5 °F)   Resp 16   Ht (S) 1.723 m (5' 7.84\")   Wt 60.8 kg (134 lb 0.6 oz)   SpO2 100%   BMI 20.48 kg/m²     KPS: 100  Physical Exam  Constitutional:       Appearance: Normal appearance.   HENT:      Head: Normocephalic and atraumatic.      Nose: Nose normal.      Mouth/Throat:     " " Mouth: Mucous membranes are moist.   Cardiovascular:      Rate and Rhythm: Normal rate and regular rhythm.      Pulses: Normal pulses.      Heart sounds: Normal heart sounds.   Pulmonary:      Effort: Pulmonary effort is normal.      Breath sounds: Normal breath sounds.   Abdominal:      General: Abdomen is flat.      Palpations: Abdomen is soft.      Comments: +splenomegaly     Musculoskeletal:         General: Normal range of motion.      Cervical back: Normal range of motion and neck supple.   Lymphadenopathy:      Cervical: Cervical adenopathy present.   Skin:     General: Skin is warm and dry.      Capillary Refill: Capillary refill takes 2 to 3 seconds.   Neurological:      General: No focal deficit present.      Mental Status: He is alert. Mental status is at baseline. He is disoriented.   Psychiatric:         Mood and Affect: Mood normal.         Behavior: Behavior normal.         Thought Content: Thought content normal.         Judgment: Judgment normal.           LAB DATA:    No components found for: \"CBC\"  Lab Results   Component Value Date    WBC 4.36 (L) 04/08/2025    WBC 3.49 (L) 03/06/2025    WBC 4.04 (L) 10/29/2024    WBC 5.2 02/01/2023     Lab Results   Component Value Date    HGB 15.67 04/08/2025    HGB 14.64 03/06/2025    HGB 15.68 10/29/2024    HGB 15.4 02/01/2023     Lab Results   Component Value Date    .3 04/08/2025    .9 03/06/2025    .8 10/29/2024     02/01/2023       Lab Results   Component Value Date    GLUCOSE 92 03/06/2025    CALCIUM 9.1 03/06/2025     03/06/2025    K 4.1 03/06/2025    CO2 29 03/06/2025     03/06/2025    BUN 14 03/06/2025    CREATININE 0.71 03/06/2025     Lab Results   Component Value Date    CREATININE 0.71 03/06/2025    CREATININE 0.77 02/01/2023       Lab Results   Component Value Date    ALT 19 03/06/2025    AST 11 (L) 03/06/2025    ALKPHOS 67 03/06/2025    BILITOT 1.0 03/06/2025           ASSESSMENT AND PLAN    31 y/o man " with mild unintended weight loss and early satiety in the setting of recent illness found to have mild cervical adenopathy and splenomegaly.  Most commonly this would be attributed to mono, but given his reported dysphagia, I will rule out a mediastinal mass.    Regarding counts:  modest leukopenia/monocytopenia- may be due to inflammation/infection.   [place] count by [facility] lab is WNL.  With normal platelets and hgb defer BMBx.    Plan:    -CBC w diff -6-6 testing all with individual WBC type diffs WNL.  I will review smear.  -comp  -ldh  -ESR CRP  -ebv testing  -hiv testing  -CT neck chest ab pelvis    FUV in 3 weeks to discuss results.      Duration of Visit  This is a New visit. I spent 55 minutes in the care of this patient, including preparing, chart and results review, face time and charting.         Slade Hines MD PhD                   [1]  No past medical history on file.  [2]  No past surgical history on file.  [3]  No current outpatient medications on file prior to visit.     No current facility-administered medications on file prior to visit.

## 2025-06-23 ENCOUNTER — HOSPITAL ENCOUNTER (OUTPATIENT)
Dept: RADIOLOGY | Facility: CLINIC | Age: 30
Discharge: HOME | End: 2025-06-23
Payer: COMMERCIAL

## 2025-06-23 DIAGNOSIS — R59.1 LYMPHADENOPATHY: ICD-10-CM

## 2025-06-23 PROCEDURE — 74177 CT ABD & PELVIS W/CONTRAST: CPT | Performed by: RADIOLOGY

## 2025-06-23 PROCEDURE — 2550000001 HC RX 255 CONTRASTS: Performed by: STUDENT IN AN ORGANIZED HEALTH CARE EDUCATION/TRAINING PROGRAM

## 2025-06-23 PROCEDURE — 71260 CT THORAX DX C+: CPT | Performed by: RADIOLOGY

## 2025-06-23 PROCEDURE — 70491 CT SOFT TISSUE NECK W/DYE: CPT | Performed by: RADIOLOGY

## 2025-06-23 PROCEDURE — 70491 CT SOFT TISSUE NECK W/DYE: CPT

## 2025-06-23 PROCEDURE — 74177 CT ABD & PELVIS W/CONTRAST: CPT

## 2025-06-23 RX ADMIN — IOHEXOL 75 ML: 350 INJECTION, SOLUTION INTRAVENOUS at 09:31

## 2025-06-27 ENCOUNTER — OFFICE VISIT (OUTPATIENT)
Dept: HEMATOLOGY/ONCOLOGY | Facility: HOSPITAL | Age: 30
End: 2025-06-27
Payer: COMMERCIAL

## 2025-06-27 VITALS
TEMPERATURE: 97.5 F | HEART RATE: 51 BPM | SYSTOLIC BLOOD PRESSURE: 126 MMHG | WEIGHT: 131.5 LBS | DIASTOLIC BLOOD PRESSURE: 81 MMHG | RESPIRATION RATE: 16 BRPM | OXYGEN SATURATION: 100 % | BODY MASS INDEX: 20.09 KG/M2

## 2025-06-27 DIAGNOSIS — R59.1 LYMPHADENOPATHY: ICD-10-CM

## 2025-06-27 PROCEDURE — 99215 OFFICE O/P EST HI 40 MIN: CPT | Performed by: STUDENT IN AN ORGANIZED HEALTH CARE EDUCATION/TRAINING PROGRAM

## 2025-06-27 ASSESSMENT — PAIN SCALES - GENERAL: PAINLEVEL_OUTOF10: 0-NO PAIN

## 2025-06-27 NOTE — PROGRESS NOTES
"HEMATOLOGY/ONCOLOGY CLINIC NOTE    Consult requested by Dr. Jha for evaluation and treatment of leukopenia.    HPI:  31 y/o man with isolated mild monocytopenia stable over the course of 7 months presents for evaluation.   Has lost 4 lbs over the past 2-3 months.  Gets full a little faster than he used to.  Had a sore throat mid May.  He's had a an unremarkable dyspepsia workup including a negative EGD.  He feels food gets \"stuck in his throat.\"    Denies f/c or night sweats.     No recent infection.  No abnormal joint pain.    Family of [place] decent.  Sister is 21 also has feeling of ab bloating but no fevers or susan ab pain.    6-27-25:  Presents in follow up. Workup mainly revealing some reactive LAD from likely EBV infection.  No signs of occult lymphoma.  He had a small pulmonary nodule (< 6 MM) on CT.    Oncology History and Treatment synopsis  Oncology History    No history exists.       PMH:  Medical History[1]    PSH:  Surgical History[2]    SH:  Social History     Tobacco Use    Smoking status: Never    Smokeless tobacco: Never   Substance Use Topics    Alcohol use: Not Currently      reports no history of drug use.      CURRENT MEDS:  Medications Ordered Prior to Encounter[3]    PHYSICAL EXAM:  /81 (BP Location: Left arm, Patient Position: Sitting, BP Cuff Size: Small adult)   Pulse 51   Temp 36.4 °C (97.5 °F) (Skin)   Resp 16   Wt 59.6 kg (131 lb 8 oz)   SpO2 100%   BMI 20.09 kg/m²     KPS: 100  Physical Exam  Constitutional:       Appearance: Normal appearance.   HENT:      Head: Normocephalic and atraumatic.      Nose: Nose normal.      Mouth/Throat:      Mouth: Mucous membranes are moist.   Cardiovascular:      Rate and Rhythm: Normal rate and regular rhythm.      Pulses: Normal pulses.      Heart sounds: Normal heart sounds.   Pulmonary:      Effort: Pulmonary effort is normal.      Breath sounds: Normal breath sounds.   Abdominal:      General: Abdomen is flat.      Palpations: " "Abdomen is soft.      Comments: +splenomegaly     Musculoskeletal:         General: Normal range of motion.      Cervical back: Normal range of motion and neck supple.   Lymphadenopathy:      Cervical: Cervical adenopathy present.   Skin:     General: Skin is warm and dry.      Capillary Refill: Capillary refill takes 2 to 3 seconds.   Neurological:      General: No focal deficit present.      Mental Status: He is alert. Mental status is at baseline. He is disoriented.   Psychiatric:         Mood and Affect: Mood normal.         Behavior: Behavior normal.         Thought Content: Thought content normal.         Judgment: Judgment normal.           LAB DATA:    No components found for: \"CBC\"  Lab Results   Component Value Date    WBC 3.5 (L) 06/06/2025    WBC 4.36 (L) 04/08/2025    WBC 3.49 (L) 03/06/2025    WBC 4.04 (L) 10/29/2024    WBC 5.2 02/01/2023     Lab Results   Component Value Date    HGB 14.5 06/06/2025    HGB 15.67 04/08/2025    HGB 14.64 03/06/2025    HGB 15.68 10/29/2024    HGB 15.4 02/01/2023     Lab Results   Component Value Date     06/06/2025    .3 04/08/2025    .9 03/06/2025    .8 10/29/2024     02/01/2023       Lab Results   Component Value Date    GLUCOSE 90 06/06/2025    CALCIUM 9.7 06/06/2025     06/06/2025    K 4.2 06/06/2025    CO2 30 06/06/2025     06/06/2025    BUN 13 06/06/2025    CREATININE 0.70 06/06/2025     Lab Results   Component Value Date    CREATININE 0.70 06/06/2025    CREATININE 0.71 03/06/2025    CREATININE 0.77 02/01/2023       Lab Results   Component Value Date    ALT 17 06/06/2025    AST 16 06/06/2025    ALKPHOS 81 06/06/2025    BILITOT 0.6 06/06/2025           ASSESSMENT AND PLAN    29 y/o man with mild unintended weight loss and early satiety in the setting of recent illness found to have mild cervical adenopathy and splenomegaly.  Most commonly this would be attributed to mono, but given his reported dysphagia, I will rule out " a mediastinal mass.    Regarding counts:  modest leukopenia/monocytopenia- may be due to inflammation/infection.   [place] count by [facility] lab is WNL.  With normal platelets and hgb defer BMBx.    6-27-25:  Presents in follow up. Workup mainly revealing some reactive LAD from likely EBV infection.  No signs of occult lymphoma.  He had a small pulmonary nodule (< 6 MM) on CT, as he is a nonsmoker  and age 30, no need for repeat imaging.  Still having some weight loss, last place I would look for occult malignancy is the colon, but he has a known negative C scope in 2024.    Overall I think his mild cytopenia is in the setting of inflammation from his mono.  Follow up PRN.  Please refer him back his absolute neutrophil count < 1k or platelets < 100.      Duration of Visit  This is a follow up visit. I spent 40 minutes in the care of this patient, including preparing, chart and results review, face time and charting.         Slade Hines MD PhD                     [1] No past medical history on file.  [2] No past surgical history on file.  [3]   No current outpatient medications on file prior to visit.     No current facility-administered medications on file prior to visit.